# Patient Record
Sex: MALE | Race: WHITE | NOT HISPANIC OR LATINO | Employment: FULL TIME | URBAN - METROPOLITAN AREA
[De-identification: names, ages, dates, MRNs, and addresses within clinical notes are randomized per-mention and may not be internally consistent; named-entity substitution may affect disease eponyms.]

---

## 2021-01-01 ENCOUNTER — VIRTUAL VISIT (OUTPATIENT)
Dept: URGENT CARE | Facility: CLINIC | Age: 66
End: 2021-01-01
Payer: COMMERCIAL

## 2021-01-01 DIAGNOSIS — Z20.822 EXPOSURE TO COVID-19 VIRUS: Primary | ICD-10-CM

## 2021-01-01 PROCEDURE — 99203 OFFICE O/P NEW LOW 30 MIN: CPT | Mod: 95 | Performed by: NURSE PRACTITIONER

## 2021-01-01 NOTE — PROGRESS NOTES
SUBJECTIVE:   Ta Rios is a 65 year old male presenting with a chief complaint of covid exposure.   No symptoms.   Exposure to COVID positive wife (from her mom's nursing home)    No past medical history on file.  Current Outpatient Medications   Medication Sig Dispense Refill     CIPROFLOXACIN TABS 500 MG OR 1 TAB BID prn traveler's diarrhea 10 0     ESCITALOPRAM OXALATE PO        LISINOPRIL PO Take by mouth daily       VIVOTIF NADER VACCINE CPEC   OR 1 CAP PO QOD 1 hour ac x 4 doses, keep refrigerated 4 0     Social History     Tobacco Use     Smoking status: Never Smoker     Smokeless tobacco: Never Used   Substance Use Topics     Alcohol use: Not on file       ROS:  Review of systems negative except as stated above.    OBJECTIVE:  GENERAL APPEARANCE:  alert and no distress  RESP: lungs clear   CV: regular rates and rhythm  SKIN: no suspicious lesions or rashes    ASSESSMENT:  (Z20.822) Exposure to COVID-19 virus  (primary encounter diagnosis)    Plan: Asymptomatic COVID-19 Virus (Coronavirus) by PCR  Isolate as discussed  Home treat and monitor for symptoms    Telephone time spent 11 minutes    JUN Cordon CNP

## 2021-01-02 DIAGNOSIS — Z20.822 EXPOSURE TO COVID-19 VIRUS: ICD-10-CM

## 2021-01-02 PROCEDURE — U0003 INFECTIOUS AGENT DETECTION BY NUCLEIC ACID (DNA OR RNA); SEVERE ACUTE RESPIRATORY SYNDROME CORONAVIRUS 2 (SARS-COV-2) (CORONAVIRUS DISEASE [COVID-19]), AMPLIFIED PROBE TECHNIQUE, MAKING USE OF HIGH THROUGHPUT TECHNOLOGIES AS DESCRIBED BY CMS-2020-01-R: HCPCS | Performed by: NURSE PRACTITIONER

## 2021-01-02 PROCEDURE — U0005 INFEC AGEN DETEC AMPLI PROBE: HCPCS | Performed by: NURSE PRACTITIONER

## 2021-01-03 LAB
SARS-COV-2 RNA SPEC QL NAA+PROBE: NOT DETECTED
SPECIMEN SOURCE: NORMAL

## 2021-01-04 ENCOUNTER — VIRTUAL VISIT (OUTPATIENT)
Dept: URGENT CARE | Facility: CLINIC | Age: 66
End: 2021-01-04
Payer: COMMERCIAL

## 2021-01-04 ENCOUNTER — NURSE TRIAGE (OUTPATIENT)
Dept: NURSING | Facility: CLINIC | Age: 66
End: 2021-01-04

## 2021-01-04 DIAGNOSIS — Z20.822 EXPOSURE TO COVID-19 VIRUS: Primary | ICD-10-CM

## 2021-01-04 PROCEDURE — 99213 OFFICE O/P EST LOW 20 MIN: CPT | Mod: 95 | Performed by: FAMILY MEDICINE

## 2021-01-04 NOTE — PROGRESS NOTES
SUBJECTIVE: wife tested positive for covid about 5 days ago.     Tested but only a day after he found out.     Would like to be retested.     asymptomatic       ICD-10-CM    1. Exposure to COVID-19 virus  Z20.822 Asymptomatic COVID-19 Virus (Coronavirus) by PCR    testing indicated due to exposure. Discussed Tuesday or later as Tuesday would be day 5 from last exposure. He is quaratining from her.     Time 5 minutes

## 2021-01-04 NOTE — TELEPHONE ENCOUNTER
Patient has been tested for COVID on 1/2/2020.   Last exposure to someone who tested positive was on Friday 1/1/2020.  Wife is positive, went to a friends house who he found out now also has tested positive.   Wonders if he should get another COVID test.   RN discussed he would need to speak to a provider. Pt thought he was speaking to provider, RN advises this is the RN line and unfortunately we can't order testing.    Currently in [place], patient was advised he would need to be in the [facility] for a visit. Pt then confirmed that he would need to come to the [facility] 5 miles away for an appointment.  RN confirmed.   Patient stated understanding and Julienne in scheduling assist with making appointment.     Anna Henry RN 01/04/21 3:33 PM  [facility] [place] Nurse Advisor    Reason for Disposition    [1] CLOSE CONTACT COVID-19 EXPOSURE within last 14 days AND [2] NO symptoms    Additional Information    Negative: COVID-19 lab test positive    Negative: [1] Lives with someone known to have influenza (flu test positive) AND [2] flu-like symptoms (e.g., cough, runny nose, sore throat, SOB; with or without fever)    Negative: [1] Symptoms of COVID-19 (e.g., cough, fever, SOB, or others) AND [2] HCP diagnosed COVID-19 based on symptoms    Negative: [1] Symptoms of COVID-19 (e.g., cough, fever, SOB, or others) AND [2] lives in an area with community spread    Negative: [1] Symptoms of COVID-19 (e.g., cough, fever, SOB, or others) AND [2] within 14 days of EXPOSURE (close contact) with diagnosed or suspected COVID-19 patient    Negative: [1] Symptoms of COVID-19 (e.g., cough, fever, SOB, or others) AND [2] within 14 days of travel from high-risk area for COVID-19 community spread (identified by CDC)    Negative: [1] Difficulty breathing (shortness of breath) occurs AND [2] onset > 14 days after COVID-19 EXPOSURE (Close Contact)    Negative: [1] Dry cough occurs AND [2] onset > 14 days after COVID-19  EXPOSURE    Negative: [1] Wet cough (i.e., white-yellow, yellow, green, or sandeep colored sputum) AND [2] onset > 14 days after COVID-19 EXPOSURE    Negative: [1] Common cold symptoms AND [2] onset > 14 days after COVID-19 EXPOSURE    Negative: [1] CLOSE CONTACT COVID-19 EXPOSURE within last 14 days AND [2] needs COVID-19 lab test to return to work AND [3] NO symptoms    Negative: [1] CLOSE CONTACT COVID-19 EXPOSURE within last 14 days AND [2] exposed person is a healthcare worker who was NOT using all recommended personal protective equipment (i.e., a respirator-N95 mask, eye protection, gloves, and gown) AND [3] NO symptoms    Negative: [1] CLOSE CONTACT COVID-19 EXPOSURE within last 14 days AND [2] exposed person is a  (e.g., police or paramedic) AND [3] NO symptoms    Negative: [1] Living or working in a correctional facility, long-term care facility, or shelter (i.e., congregate setting; densely populated) AND [2] where an outbreak has occurred AND [3] NO symptoms    Protocols used: CORONAVIRUS (COVID-19) EXPOSURE-A- 12.1

## 2021-01-05 DIAGNOSIS — Z20.822 EXPOSURE TO COVID-19 VIRUS: ICD-10-CM

## 2021-01-05 PROCEDURE — U0005 INFEC AGEN DETEC AMPLI PROBE: HCPCS | Performed by: FAMILY MEDICINE

## 2021-01-05 PROCEDURE — U0003 INFECTIOUS AGENT DETECTION BY NUCLEIC ACID (DNA OR RNA); SEVERE ACUTE RESPIRATORY SYNDROME CORONAVIRUS 2 (SARS-COV-2) (CORONAVIRUS DISEASE [COVID-19]), AMPLIFIED PROBE TECHNIQUE, MAKING USE OF HIGH THROUGHPUT TECHNOLOGIES AS DESCRIBED BY CMS-2020-01-R: HCPCS | Performed by: FAMILY MEDICINE

## 2021-01-07 ENCOUNTER — TELEPHONE (OUTPATIENT)
Dept: EMERGENCY MEDICINE | Facility: CLINIC | Age: 66
End: 2021-01-07

## 2021-01-07 LAB
SARS-COV-2 RNA RESP QL NAA+PROBE: ABNORMAL
SPECIMEN SOURCE: ABNORMAL

## 2021-01-07 NOTE — TELEPHONE ENCOUNTER
Coronavirus (COVID-19) Notification    Reason for call  Notify of POSITIVE  COVID-19 lab result, assess symptoms,  review [facility] recommendations    Lab Result   Lab test for 2019-nCoV rRt-PCR or SARS-COV-2 PCR  Oropharyngeal AND/OR nasopharyngeal swabs were POSITIVE for 2019-nCoV RNA [OR] SARS-COV-2 RNA (COVID-19) RNA     We have been unable to reach Patient by phone at this time to notify of their Positive COVID-19 result.  Left voicemail message requesting a call back to [phone] [facility] for results.        POSITIVE COVID-19 Letter sent.    Catalina Rodrigues LPN

## 2021-01-07 NOTE — TELEPHONE ENCOUNTER
"Coronavirus (COVID-19) Notification    Caller Name (Patient, parent, daughter/son, grandparent, etc)  Ta Yoderh    Reason for call  Notify of Positive Coronavirus (COVID-19) lab results, assess symptoms,  review [facility] recommendations    Lab Result    Lab test:  2019-nCoV rRt-PCR or SARS-CoV-2 PCR    Oropharyngeal AND/OR nasopharyngeal swabs is POSITIVE for 2019-nCoV RNA/SARS-COV-2 PCR (COVID-19 virus)    RN Recommendations/Instructions per [facility] Coronavirus COVID-19 recommendations    Brief introduction script  Introduce self then review script:  \"I am calling on behalf of [employer].  We were notified that your Coronavirus test (COVID-19) for was POSITIVE for the virus.  I have some information to relay to you but first I wanted to mention that the [place] Dept of Health will be contacting you shortly [it's possible MD already called Patient] to talk to you more about how you are feeling and other people you have had contact with who might now also have the virus.  Also, [facility] is Partnering with the [facility] for Covid-19 research, you may be contacted directly by research staff.\"    Assessment (Inquire about Patient's current symptoms)   Assessment   Current Symptoms at time of phone call: (if no symptoms, document No symptoms] None   Symptoms onset (if applicable) NA     If at time of call, Patients symptoms hare worsened, the Patient should contact 911 or have someone drive them to Emergency Dept promptly:      If Patient calling 911, inform 911 personal that you have tested positive for the Coronavirus (COVID-19).  Place mask on and await 911 to arrive.    If Emergency Dept, If possible, please have another adult drive you to the Emergency Dept but you need to wear mask when in contact with other people.      Monoclonal Antibody Administration    You may be eligible to receive a new treatment with a monoclonal antibody for preventing hospitalization in patients at " "high risk for complications from COVID-19.   This medication is still experimental and available on a limited basis; it is given through an IV and must be given at an infusion center. Please note that not all people who are eligible will receive the medication since it is in limited supply.     Are you interested in being considered for this medication?  No.   Does the patient fit the criteria: Patient declined    If patient qualifies based on above criteria:  \"We will contact you if you are selected to receive the medication in the next 1-2 days.   This is time sensitive and if you are not selected in the next 1-2 days, you will not receive the medication.  If you do not receive a call to schedule, you have not been selected.\"    Review information with Patient    Your result was positive. This means you have COVID-19 (coronavirus).  We have sent you a letter that reviews the information that I'll be reviewing with you now.    How can I protect others?    If you have symptoms: stay home and away from others (self-isolate) until:    You've had no fever--and no medicine that reduces fever--for 1 full day (24 hours). And       Your other symptoms have gotten better. For example, your cough or breathing has improved. And     At least 10 days have passed since your symptoms started. (If you've been told by a doctor that you have a weak immune system, wait 20 days.)     If you don't have symptoms: Stay home and away from others (self-isolate) until at least 10 days have passed since your first positive COVID-19 test. (Date test collected)    During this time:    Stay in your own room, including for meals. Use your own bathroom if you can.    Stay away from others in your home. No hugging, kissing or shaking hands. No visitors.     Don't go to work, school or anywhere else.     Clean  high touch  surfaces often (doorknobs, counters, handles, etc.). Use a household cleaning spray or wipes. You'll find a full list on the " EPA website at www.epa.gov/pesticide-registration/list-n-disinfectants-use-against-sars-cov-2.     Cover your mouth and nose with a mask, tissue or other face covering to avoid spreading germs.    Wash your hands and face often with soap and water.    Caregivers in these groups are at risk for severe illness due to COVID-19:  o People 65 years and older  o People who live in a nursing home or long-term care facility  o People with chronic disease (lung, heart, cancer, diabetes, kidney, liver, immunologic)  o People who have a weakened immune system, including those who:  - Are in cancer treatment  - Take medicine that weakens the immune system, such as corticosteroids  - Had a bone marrow or organ transplant  - Have an immune deficiency  - Have poorly controlled HIV or AIDS  - Are obese (body mass index of 40 or higher)  - Smoke regularly    Caregivers should wear gloves while washing dishes, handling laundry and cleaning bedrooms and bathrooms.    Wash and dry laundry with special caution. Don't shake dirty laundry, and use the warmest water setting you can.    If you have a weakened immune system, ask your doctor about other actions you should take.    For more tips, go to www.cdc.gov/coronavirus/2019-ncov/downloads/10Things.pdf.    You should not go back to work until you meet the guidelines above for ending your home isolation. You don't need to be retested for COVID-19 before going back to work--studies show that you won't spread the virus if it's been at least 10 days since your symptoms started (or 20 days, if you have a weak immune system).    Employers: This document serves as formal notice of your employee's medical guidelines for going back to work. They must meet the above guidelines before going back to work in person.    How can I take care of myself?    1. Get lots of rest. Drink extra fluids (unless a doctor has told you not to).    2. Take Tylenol (acetaminophen) for fever or pain. If you have liver  or kidney problems, ask your family doctor if it's okay to take Tylenol.     Take either:     650 mg (two 325 mg pills) every 4 to 6 hours, or     1,000 mg (two 500 mg pills) every 8 hours as needed.     Note: Don't take more than 3,000 mg in one day. Acetaminophen is found in many medicines (both prescribed and over-the-counter medicines). Read all labels to be sure you don't take too much.    For children, check the Tylenol bottle for the right dose (based on their age or weight).    3. If you have other health problems (like cancer, heart failure, an organ transplant or severe kidney disease): Call your specialty clinic if you don't feel better in the next 2 days.    4. Know when to call 911: Emergency warning signs include:    Trouble breathing or shortness of breath    Pain or pressure in the chest that doesn't go away    Feeling confused like you haven't felt before, or not being able to wake up    Bluish-colored lips or face    5. Sign up for [employer]. We know it's scary to hear that you have COVID-19. We want to track your symptoms to make sure you're okay over the next 2 weeks. Please look for an email from [employer]--this is a free, online program that we'll use to keep in touch. To sign up, follow the link in the email. Learn more at [email or web address].    Where can I get more information?    [facility]: [email or web address]    Coronavirus Basics: www.health.[facility].[place]/diseases/coronavirus/basics.html    What to Do If You're Sick: www.cdc.gov/coronavirus/2019-ncov/about/steps-when-sick.html    Ending Home Isolation: www.cdc.gov/coronavirus/2019-ncov/hcp/disposition-in-home-patients.html     Caring for Someone with COVID-19: www.cdc.gov/coronavirus/2019-ncov/if-you-are-sick/care-for-someone.html     [facility] clinical trials (COVID-19 research studies): [email or web address]     A Positive COVID-19 letter will be sent via [employer] or the  mail. (Exception, no letters sent to Presurgerical/Preprocedure Patients)    Floridalma Thomas LPN

## 2021-01-15 ENCOUNTER — HEALTH MAINTENANCE LETTER (OUTPATIENT)
Age: 66
End: 2021-01-15

## 2021-09-04 ENCOUNTER — HEALTH MAINTENANCE LETTER (OUTPATIENT)
Age: 66
End: 2021-09-04

## 2022-03-31 NOTE — PROGRESS NOTES
Ta is a 66 year old who is being evaluated via a billable video visit.      How would you like to obtain your AVS? MyChart  If the video visit is dropped, the invitation should be resent by: Text to cell phone: done  Will anyone else be joining your video visit? Cecile Duarteman     Video Start Time: 3:15 Pm    Assessment & Plan     Travel advice encounter  To [facility]   - TYPHOID VACCINE, IM; Future  - atovaquone-proguanil (MALARONE) 250-100 MG tablet; Take 1 tablet by mouth daily Start 2 days before exposure to Malaria and continue daily till  7 days after exposure.  - azithromycin (ZITHROMAX) 500 MG tablet; Take 1 tablet (500 mg) by mouth daily for 3 doses Take 1 tablet a day for up to 3 days for severe diarrhea    15 minutes spent on the date of the encounter doing patient visit, documentation and discussion with family        See Patient Instructions    No follow-ups on file.    Justina Martinez, JUN [facility]    Nolvia Chappell is a 66 year old who presents for the following health issues  accompanied by his spouse and other family members (total of 5 ) .    HPI   Nurse Note      Itinerary:  [place], [place], [place], [place]      Departure Date: 06/08/2022      Return Date: 06/24/2022      Length of Trip 16 days       Reason for Travel: Tourism           Urban or rural: both      Accommodations: Hotel    [facility]        IMMUNIZATION HISTORY    Subjective  Ta Rios is a 66 year old male  for video visit today with 4 other family members  for counsultation for international travel.   Patient will be departing in  2 month(s) and  traveling with family member(s).      Patient itinerary :  will be in the urban and rural  region of [place] then [place] which risk for Malaria, food borne illnesses, motor vehicle accidents, Typhoid and Covid. exposure.      Patient's activities will include sightseeing, safari/game salgado, beach activities (salt water) and travel by  car or other vehicle.    Patient's country of birth is [place]    Special medical concerns: blood pressure  Pre-travel questionnaire was completed by patient and reviewed by provider.     Vitals: There were no vitals taken for this visit.  BMI= There is no height or weight on file to calculate BMI.    EXAM:  General:  Well-nourished, well-developed in no acute distress.  Appears to be stated age, interacts appropriately and expresses understanding of information given to patient.    Current Outpatient Medications   Medication Sig Dispense Refill     CIPROFLOXACIN TABS 500 MG OR 1 TAB BID prn traveler's diarrhea 10 0     ESCITALOPRAM OXALATE PO        LISINOPRIL PO Take by mouth daily       VIVOTIF NADER VACCINE CPEC   OR 1 CAP PO QOD 1 hour ac x 4 doses, keep refrigerated 4 0     There is no problem list on file for this patient.    No Known Allergies      Immunizations discussed include:   Covid 19: Up to date  Hepatitis A:  Up to date  Hepatitis B: Up to date  Influenza: Up to date  Typhoid: future order placed for 05/25/2022  Rabies: Declined  reviewed managment of a animal bite or scratch (washing wound, seek medical care within 24 hours for post exposure prophylaxis )  Yellow Fever: Not indicated  Japanese Encephalitis: Not indicated  Meningococcus: Not indicated  Tetanus/Diphtheria: Up to date  Measles/Mumps/Rubella: Immune by disease history per patient report  Cholera: Not needed  Polio: Not indicated  Pneumococcal: up to date  Varicella: Immune by disease history per patient report  Shingrix: Up to date  HPV:  Not indicated     TB: low risk     Alt[place] Exposure on this trip: no  Past tolerance to Altitude: na    ASSESSMENT/PLAN:  Ta was seen today for travel clinic.    Diagnoses and all orders for this visit:    Travel advice encounter  -     TYPHOID VACCINE, IM; Future  -     atovaquone-proguanil (MALARONE) 250-100 MG tablet; Take 1 tablet by mouth daily Start 2 days before exposure to Malaria and  continue daily till  7 days after exposure.  -     azithromycin (ZITHROMAX) 500 MG tablet; Take 1 tablet (500 mg) by mouth daily for 3 doses Take 1 tablet a day for up to 3 days for severe diarrhea      I have reviewed general recommendations for safe travel   including: food/water precautions, insect precautions, safer sex   practices given high prevalence of Zika, HIV and other STDs,   roadway safety. Educational materials and [employer] report provided.    Malaraia prophylaxis recommended: Malarone  Symptomatic treatment for traveler's diarrhea: azithromycin      Personal protective measures reviewed including hand sanitizing and contact precautions for the prevention of viral illnesses. Cover coughs and masking  during travel and upon return.  Current COVID 19 pandemic.   Monitor / follow current CDC guidelines.    Country specific and CDC Covid 19  testing requirements and resources given to patient.    Evacuation insurance advised and resources were provided to patient.    Total visit time 15 minutes  with over 50% of time spent counseling patient as detailed above.      Justina Martinez CNP  Certificate in Travel Health      Review of Systems   Constitutional, HEENT, cardiovascular, pulmonary, gi and gu systems are negative, except as otherwise noted.      Objective           Vitals:  No vitals were obtained today due to virtual visit.    Physical Exam   GENERAL: Healthy, alert and no distress  EYES: Eyes grossly normal to inspection.  No discharge or erythema, or obvious scleral/conjunctival abnormalities.  RESP: No audible wheeze, cough, or visible cyanosis.  No visible retractions or increased work of breathing.    SKIN: Visible skin clear. No significant rash, abnormal pigmentation or lesions.  NEURO: Cranial nerves grossly intact.  Mentation and speech appropriate for age.  PSYCH: Mentation appears normal, affect normal/bright, judgement and insight intact, normal speech and appearance well-groomed.           Video-Visit Details    Type of service:  Video Visit    Video End Time:4:30    Originating Location (pt. Location): Home    Distant Location (provider location):  [facility]     Platform used for Video Visit: Laurel Martinez (Lori) CNP  [facility]  Certificate in Travel Health

## 2022-04-06 ENCOUNTER — VIRTUAL VISIT (OUTPATIENT)
Dept: FAMILY MEDICINE | Facility: CLINIC | Age: 67
End: 2022-04-06
Payer: COMMERCIAL

## 2022-04-06 DIAGNOSIS — Z71.84 TRAVEL ADVICE ENCOUNTER: Primary | ICD-10-CM

## 2022-04-06 PROCEDURE — 99401 PREV MED CNSL INDIV APPRX 15: CPT | Performed by: NURSE PRACTITIONER

## 2022-04-06 RX ORDER — ATOVAQUONE AND PROGUANIL HYDROCHLORIDE 250; 100 MG/1; MG/1
1 TABLET, FILM COATED ORAL DAILY
Qty: 30 TABLET | Refills: 0 | Status: SHIPPED | OUTPATIENT
Start: 2022-04-06

## 2022-04-06 RX ORDER — AZITHROMYCIN 500 MG/1
500 TABLET, FILM COATED ORAL DAILY
Qty: 3 TABLET | Refills: 0 | Status: SHIPPED | OUTPATIENT
Start: 2022-04-06 | End: 2022-04-09

## 2022-04-11 NOTE — PATIENT INSTRUCTIONS
Thank you for visiting the [facility] : [phone]  You are scheduled for a Typhoid vaccine on 04/25/2022      Follow up vaccine appointments can be made as a NURSE ONLY visit at the Travel Clinic, (BE PREPARED TO WAIT, ) or at designated [facility].    If you are receiving the Rabies vaccines series, it is important that you follow the exact schedule ordered.     Pre-travel     We recommend that you purchase Medical Evacuation Insurance prior to your departure.  Https://wwwnc.cdc.gov/travel/page/insurance    [place] your travel plans with the  Department of [employer] through STEP ( Smart Traveler Enrollment Program ) https://step.state.gov.  STEP is a free service to allow [place] citizens and nationals traveling and living abroad to enroll their trip with the nearest [place] Embassy or Consulate.    Animal Exposure: Avoid all mammals even if they look healthy.  If there is a bite, scratch or even a lick, wash area immediately with soap and water for 15 minutes and seek medical care within 24 hours for evaluation of Rabies post exposure treatment.  Contact your Medical Evacuation Insurance.    COVID 19 (Sars Cov2) prevention strategies  Physical distancing: Maintain 6 foot (2m) from others.              Avoid large gatherings and public transportation.   Avoid indoor shopping malls, theaters and restaurants   Practice consistent mask wearing covering the nose, mouth and underneath the chin when unable to maintain 6 foot distance from others.  Hand washing: frequent, thorough handwashing with soap and water for 20 seconds (or using a hand  containing 60% alcohol)   Avoid touching face, nose, eyes, mouth unless you have done appropriate hand washing as above.   Clean high touch surfaces with approved disinfectant against Covid 19  (70% Ethanol ) or a bleach solution (add 20 mL (4 teaspoons) of bleach to 1 L (1 quart) of water;)  Be careful not to breath or touch  bleach.      Travel Covid 19 Testing:  updated 12/06/2021  International travelers: Pre-travel: diagnostic testing (antigen or PCR) may be required for entry:  See country specific Embassy websites or airline websites.    [place] ENTRY Requirements: Effective December 6, 2021, all international arrivals to the [place] (regardless of vaccination status or citizenship status) by air are required to have a negative predeparture COVID-19 result from a test taken no more than 1 calendar day prior to departure of the flight to the [place]. Many complex scenarios may result from the 1-day rule. For example, for a flight that arrives in the [place] on a Monday, the test must have been taken no earlier than Sunday local time in the departure city. If the itinerary contains multiple flights, the test can be taken 1 day prior to departure of the first flight or can be taken en route, as long as the connecting airport is not in the [place]. If the test is unable to be taken en route, the traveler will not be able to enter the [place], or if the test is taken en route and is positive, the traveler will have to isolate in that location. If the itinerary contains 1 or more overnight stays en route to the [place], the test must have been taken 1 calendar day before the flight that will enter the [place]; however, if the itinerary has an overnight connection due to limitations in flight availability, retesting will not be required. If the first flight within an itinerary is delayed due to severe weather, aircraft mechanical issues, or other issues outside of the traveler's control, the traveler will only need to be retested if the delay causes the original test to be 24 hours or more past the 1-day window. If a connecting flight is delayed due to any of the above issues, the traveler will only need to be retested if the delay causes the original test to be 48 hours or more past the 1-day window. If a trip of less than 1 day is made out the [place], a viral test taken in the [place]  may be used as a predeparture test as long as the test was taken no more than 1 day prior to rearrival in the [place]; however, if a delay occurs on the return trip and the predeparture test is out of the 1-day window, the traveler will need to be retested before returning to the [place]. Noncitizen nonimmigrants who are unvaccinated remain banned from entry    Post travel: CDC recommends getting tested 3-5 days after your trip AND stay home and self-quarantine for 7 days.      COVID-19 testing scheduling number for pre-travel through [facility]  [phone] (Must have an order). Available 24 hours a day.  You can also schedule through My Chart.     Post-travel illness:  Contact your provider or [facility] if you develop a fever, rash, cough, diarrhea or other symptoms for up to 1 year after travel.  Inform your healthcare provider when and where you traveled to.    Please call the [facility] with any questions [phone]  Or send your provider a 'My Chart' note.

## 2022-04-25 ENCOUNTER — ALLIED HEALTH/NURSE VISIT (OUTPATIENT)
Dept: FAMILY MEDICINE | Facility: CLINIC | Age: 67
End: 2022-04-25
Payer: MEDICARE

## 2022-04-25 VITALS — TEMPERATURE: 98.5 F

## 2022-04-25 DIAGNOSIS — Z23 NEED FOR IMMUNIZATION AGAINST TYPHOID: Primary | ICD-10-CM

## 2022-04-25 DIAGNOSIS — Z71.84 TRAVEL ADVICE ENCOUNTER: ICD-10-CM

## 2022-04-25 PROCEDURE — 90471 IMMUNIZATION ADMIN: CPT | Mod: GA

## 2022-04-25 PROCEDURE — 99207 PR NO CHARGE NURSE ONLY: CPT

## 2022-04-25 PROCEDURE — 90691 TYPHOID VACCINE IM: CPT | Mod: GA

## 2022-04-25 NOTE — PROGRESS NOTES
Prior to immunization administration, verified patients identity using patient s name and date of birth. Please see Immunization Activity for additional information.     Screening Questionnaire for Adult Immunization    Are you sick today?   No   Do you have allergies to medications, food, a vaccine component or latex?   No   Have you ever had a serious reaction after receiving a vaccination?   No   Do you have a long-term health problem with heart, lung, kidney, or metabolic disease (e.g., diabetes), asthma, a blood disorder, no spleen, complement component deficiency, a cochlear implant, or a spinal fluid leak?  Are you on long-term aspirin therapy?   Yes   Do you have cancer, leukemia, HIV/AIDS, or any other immune system problem?   No   Do you have a parent, brother, or sister with an immune system problem?   No   In the past 3 months, have you taken medications that affect  your immune system, such as prednisone, other steroids, or anticancer drugs; drugs for the treatment of rheumatoid arthritis, Crohn s disease, or psoriasis; or have you had radiation treatments?   No   Have you had a seizure, or a brain or other nervous system problem?   No   During the past year, have you received a transfusion of blood or blood    products, or been given immune (gamma) globulin or antiviral drug?   No   For women: Are you pregnant or is there a chance you could become       pregnant during the next month?   No   Have you received any vaccinations in the past 4 weeks?   No     Immunization questionnaire was positive for at least one answer.  Notified Juan.        Per orders of Dr. Martinez , injection of Typhoid given by Shilpa Jean Baptiste RN. Patient instructed to remain in clinic for 15 minutes afterwards, and to report any adverse reaction to me immediately.       Screening performed by Shilpa Jean Baptiste RN on 4/25/2022 at 4:12 PM.

## 2022-10-22 ENCOUNTER — HEALTH MAINTENANCE LETTER (OUTPATIENT)
Age: 67
End: 2022-10-22

## 2023-06-01 ENCOUNTER — HEALTH MAINTENANCE LETTER (OUTPATIENT)
Age: 68
End: 2023-06-01

## 2024-08-10 ENCOUNTER — HEALTH MAINTENANCE LETTER (OUTPATIENT)
Age: 69
End: 2024-08-10